# Patient Record
Sex: FEMALE | Race: WHITE | NOT HISPANIC OR LATINO | Employment: FULL TIME | URBAN - METROPOLITAN AREA
[De-identification: names, ages, dates, MRNs, and addresses within clinical notes are randomized per-mention and may not be internally consistent; named-entity substitution may affect disease eponyms.]

---

## 2022-10-28 ENCOUNTER — HOSPITAL ENCOUNTER (INPATIENT)
Facility: CLINIC | Age: 27
LOS: 1 days | Discharge: HOME | End: 2022-10-30
Attending: EMERGENCY MEDICINE | Admitting: OBSTETRICS & GYNECOLOGY
Payer: COMMERCIAL

## 2022-10-28 PROCEDURE — 99285 EMERGENCY DEPT VISIT HI MDM: CPT | Mod: 25

## 2022-10-28 PROCEDURE — C9803 HOPD COVID-19 SPEC COLLECT: HCPCS

## 2022-10-28 RX ORDER — LIDOCAINE 40 MG/G
CREAM TOPICAL
Status: DISCONTINUED | OUTPATIENT
Start: 2022-10-28 | End: 2022-10-30

## 2022-10-29 LAB
ABO/RH(D): NORMAL
ALBUMIN SERPL BCG-MCNC: 3.1 G/DL (ref 3.5–5.2)
ALBUMIN UR-MCNC: NEGATIVE MG/DL
ALP SERPL-CCNC: 126 U/L (ref 35–104)
ALT SERPL W P-5'-P-CCNC: 79 U/L (ref 10–35)
ALT SERPL W P-5'-P-CCNC: 89 U/L (ref 10–35)
ANION GAP SERPL CALCULATED.3IONS-SCNC: 11 MMOL/L (ref 7–15)
ANTIBODY SCREEN: NEGATIVE
APPEARANCE UR: CLEAR
AST SERPL W P-5'-P-CCNC: 63 U/L (ref 10–35)
AST SERPL W P-5'-P-CCNC: 79 U/L (ref 10–35)
BASOPHILS # BLD AUTO: 0 10E3/UL (ref 0–0.2)
BASOPHILS NFR BLD AUTO: 0 %
BILIRUB SERPL-MCNC: 0.2 MG/DL
BILIRUB UR QL STRIP: NEGATIVE
BUN SERPL-MCNC: 12.1 MG/DL (ref 6–20)
CALCIUM SERPL-MCNC: 9 MG/DL (ref 8.6–10)
CHLORIDE SERPL-SCNC: 108 MMOL/L (ref 98–107)
COLOR UR AUTO: ABNORMAL
CREAT SERPL-MCNC: 0.63 MG/DL (ref 0.51–0.95)
CREAT SERPL-MCNC: 0.67 MG/DL (ref 0.51–0.95)
DEPRECATED HCO3 PLAS-SCNC: 23 MMOL/L (ref 22–29)
EOSINOPHIL # BLD AUTO: 0.2 10E3/UL (ref 0–0.7)
EOSINOPHIL NFR BLD AUTO: 1 %
ERYTHROCYTE [DISTWIDTH] IN BLOOD BY AUTOMATED COUNT: 13.9 % (ref 10–15)
ERYTHROCYTE [DISTWIDTH] IN BLOOD BY AUTOMATED COUNT: 14 % (ref 10–15)
GFR SERPL CREATININE-BSD FRML MDRD: >90 ML/MIN/1.73M2
GFR SERPL CREATININE-BSD FRML MDRD: >90 ML/MIN/1.73M2
GLUCOSE SERPL-MCNC: 73 MG/DL (ref 70–99)
GLUCOSE UR STRIP-MCNC: NEGATIVE MG/DL
HCT VFR BLD AUTO: 30.6 % (ref 35–47)
HCT VFR BLD AUTO: 32.4 % (ref 35–47)
HGB BLD-MCNC: 9.2 G/DL (ref 11.7–15.7)
HGB BLD-MCNC: 9.9 G/DL (ref 11.7–15.7)
HGB UR QL STRIP: ABNORMAL
IMM GRANULOCYTES # BLD: 0.1 10E3/UL
IMM GRANULOCYTES NFR BLD: 1 %
KETONES UR STRIP-MCNC: NEGATIVE MG/DL
LEUKOCYTE ESTERASE UR QL STRIP: ABNORMAL
LYMPHOCYTES # BLD AUTO: 2.3 10E3/UL (ref 0.8–5.3)
LYMPHOCYTES NFR BLD AUTO: 19 %
MAGNESIUM SERPL-MCNC: 1.8 MG/DL (ref 1.7–2.3)
MCH RBC QN AUTO: 28.2 PG (ref 26.5–33)
MCH RBC QN AUTO: 28.2 PG (ref 26.5–33)
MCHC RBC AUTO-ENTMCNC: 30.1 G/DL (ref 31.5–36.5)
MCHC RBC AUTO-ENTMCNC: 30.6 G/DL (ref 31.5–36.5)
MCV RBC AUTO: 92 FL (ref 78–100)
MCV RBC AUTO: 94 FL (ref 78–100)
MONOCYTES # BLD AUTO: 0.8 10E3/UL (ref 0–1.3)
MONOCYTES NFR BLD AUTO: 7 %
NEUTROPHILS # BLD AUTO: 8.8 10E3/UL (ref 1.6–8.3)
NEUTROPHILS NFR BLD AUTO: 72 %
NITRATE UR QL: NEGATIVE
NRBC # BLD AUTO: 0 10E3/UL
NRBC BLD AUTO-RTO: 0 /100
PH UR STRIP: 5.5 [PH] (ref 5–7)
PLATELET # BLD AUTO: 304 10E3/UL (ref 150–450)
PLATELET # BLD AUTO: 327 10E3/UL (ref 150–450)
POTASSIUM SERPL-SCNC: 4 MMOL/L (ref 3.4–5.3)
PROT SERPL-MCNC: 6.8 G/DL (ref 6.4–8.3)
RBC # BLD AUTO: 3.26 10E6/UL (ref 3.8–5.2)
RBC # BLD AUTO: 3.51 10E6/UL (ref 3.8–5.2)
RBC URINE: 3 /HPF
SARS-COV-2 RNA RESP QL NAA+PROBE: NEGATIVE
SODIUM SERPL-SCNC: 142 MMOL/L (ref 136–145)
SP GR UR STRIP: 1.01 (ref 1–1.03)
SPECIMEN EXPIRATION DATE: NORMAL
SQUAMOUS EPITHELIAL: 1 /HPF
UROBILINOGEN UR STRIP-MCNC: NORMAL MG/DL
WBC # BLD AUTO: 10.5 10E3/UL (ref 4–11)
WBC # BLD AUTO: 12.1 10E3/UL (ref 4–11)
WBC URINE: 8 /HPF

## 2022-10-29 PROCEDURE — 258N000003 HC RX IP 258 OP 636: Performed by: EMERGENCY MEDICINE

## 2022-10-29 PROCEDURE — 258N000003 HC RX IP 258 OP 636: Performed by: OBSTETRICS & GYNECOLOGY

## 2022-10-29 PROCEDURE — 120N000001 HC R&B MED SURG/OB

## 2022-10-29 PROCEDURE — U0003 INFECTIOUS AGENT DETECTION BY NUCLEIC ACID (DNA OR RNA); SEVERE ACUTE RESPIRATORY SYNDROME CORONAVIRUS 2 (SARS-COV-2) (CORONAVIRUS DISEASE [COVID-19]), AMPLIFIED PROBE TECHNIQUE, MAKING USE OF HIGH THROUGHPUT TECHNOLOGIES AS DESCRIBED BY CMS-2020-01-R: HCPCS | Performed by: EMERGENCY MEDICINE

## 2022-10-29 PROCEDURE — 250N000011 HC RX IP 250 OP 636: Performed by: EMERGENCY MEDICINE

## 2022-10-29 PROCEDURE — 86850 RBC ANTIBODY SCREEN: CPT | Performed by: OBSTETRICS & GYNECOLOGY

## 2022-10-29 PROCEDURE — 85025 COMPLETE CBC W/AUTO DIFF WBC: CPT | Performed by: EMERGENCY MEDICINE

## 2022-10-29 PROCEDURE — 83735 ASSAY OF MAGNESIUM: CPT | Performed by: EMERGENCY MEDICINE

## 2022-10-29 PROCEDURE — 96374 THER/PROPH/DIAG INJ IV PUSH: CPT

## 2022-10-29 PROCEDURE — 81001 URINALYSIS AUTO W/SCOPE: CPT | Performed by: EMERGENCY MEDICINE

## 2022-10-29 PROCEDURE — 84450 TRANSFERASE (AST) (SGOT): CPT | Performed by: OBSTETRICS & GYNECOLOGY

## 2022-10-29 PROCEDURE — 82565 ASSAY OF CREATININE: CPT | Performed by: OBSTETRICS & GYNECOLOGY

## 2022-10-29 PROCEDURE — 85027 COMPLETE CBC AUTOMATED: CPT | Performed by: OBSTETRICS & GYNECOLOGY

## 2022-10-29 PROCEDURE — 36415 COLL VENOUS BLD VENIPUNCTURE: CPT | Performed by: EMERGENCY MEDICINE

## 2022-10-29 PROCEDURE — 80053 COMPREHEN METABOLIC PANEL: CPT | Performed by: EMERGENCY MEDICINE

## 2022-10-29 PROCEDURE — 36415 COLL VENOUS BLD VENIPUNCTURE: CPT | Performed by: OBSTETRICS & GYNECOLOGY

## 2022-10-29 PROCEDURE — 250N000013 HC RX MED GY IP 250 OP 250 PS 637: Performed by: OBSTETRICS & GYNECOLOGY

## 2022-10-29 PROCEDURE — 84460 ALANINE AMINO (ALT) (SGPT): CPT | Performed by: OBSTETRICS & GYNECOLOGY

## 2022-10-29 PROCEDURE — 86901 BLOOD TYPING SEROLOGIC RH(D): CPT | Performed by: OBSTETRICS & GYNECOLOGY

## 2022-10-29 PROCEDURE — 82040 ASSAY OF SERUM ALBUMIN: CPT | Performed by: EMERGENCY MEDICINE

## 2022-10-29 RX ORDER — HYDRALAZINE HYDROCHLORIDE 20 MG/ML
10 INJECTION INTRAMUSCULAR; INTRAVENOUS
Status: DISCONTINUED | OUTPATIENT
Start: 2022-10-29 | End: 2022-10-30 | Stop reason: HOSPADM

## 2022-10-29 RX ORDER — MAGNESIUM SULFATE HEPTAHYDRATE 40 MG/ML
4 INJECTION, SOLUTION INTRAVENOUS
Status: DISCONTINUED | OUTPATIENT
Start: 2022-10-29 | End: 2022-10-30 | Stop reason: HOSPADM

## 2022-10-29 RX ORDER — NALOXONE HYDROCHLORIDE 0.4 MG/ML
0.4 INJECTION, SOLUTION INTRAMUSCULAR; INTRAVENOUS; SUBCUTANEOUS
Status: DISCONTINUED | OUTPATIENT
Start: 2022-10-29 | End: 2022-10-30 | Stop reason: HOSPADM

## 2022-10-29 RX ORDER — IBUPROFEN 200 MG
200 TABLET ORAL EVERY 6 HOURS PRN
Status: DISCONTINUED | OUTPATIENT
Start: 2022-10-29 | End: 2022-10-29

## 2022-10-29 RX ORDER — MAGNESIUM SULFATE IN WATER 40 MG/ML
2 INJECTION, SOLUTION INTRAVENOUS CONTINUOUS
Status: DISCONTINUED | OUTPATIENT
Start: 2022-10-29 | End: 2022-10-30 | Stop reason: HOSPADM

## 2022-10-29 RX ORDER — LORAZEPAM 2 MG/ML
2 INJECTION INTRAMUSCULAR
Status: DISCONTINUED | OUTPATIENT
Start: 2022-10-29 | End: 2022-10-30 | Stop reason: HOSPADM

## 2022-10-29 RX ORDER — LABETALOL 100 MG/1
100 TABLET, FILM COATED ORAL EVERY 12 HOURS SCHEDULED
Status: DISCONTINUED | OUTPATIENT
Start: 2022-10-29 | End: 2022-10-30 | Stop reason: HOSPADM

## 2022-10-29 RX ORDER — SODIUM CHLORIDE, SODIUM LACTATE, POTASSIUM CHLORIDE, CALCIUM CHLORIDE 600; 310; 30; 20 MG/100ML; MG/100ML; MG/100ML; MG/100ML
INJECTION, SOLUTION INTRAVENOUS CONTINUOUS
Status: DISCONTINUED | OUTPATIENT
Start: 2022-10-29 | End: 2022-10-30 | Stop reason: HOSPADM

## 2022-10-29 RX ORDER — MAGNESIUM SULFATE HEPTAHYDRATE 40 MG/ML
2 INJECTION, SOLUTION INTRAVENOUS
Status: DISCONTINUED | OUTPATIENT
Start: 2022-10-29 | End: 2022-10-30 | Stop reason: HOSPADM

## 2022-10-29 RX ORDER — NALOXONE HYDROCHLORIDE 0.4 MG/ML
0.2 INJECTION, SOLUTION INTRAMUSCULAR; INTRAVENOUS; SUBCUTANEOUS
Status: DISCONTINUED | OUTPATIENT
Start: 2022-10-29 | End: 2022-10-30 | Stop reason: HOSPADM

## 2022-10-29 RX ORDER — LABETALOL HYDROCHLORIDE 5 MG/ML
80 INJECTION, SOLUTION INTRAVENOUS
Status: DISCONTINUED | OUTPATIENT
Start: 2022-10-29 | End: 2022-10-30 | Stop reason: HOSPADM

## 2022-10-29 RX ORDER — IBUPROFEN 600 MG/1
600 TABLET, FILM COATED ORAL EVERY 6 HOURS PRN
Status: DISCONTINUED | OUTPATIENT
Start: 2022-10-29 | End: 2022-10-30 | Stop reason: HOSPADM

## 2022-10-29 RX ORDER — SODIUM CHLORIDE, SODIUM LACTATE, POTASSIUM CHLORIDE, CALCIUM CHLORIDE 600; 310; 30; 20 MG/100ML; MG/100ML; MG/100ML; MG/100ML
10-125 INJECTION, SOLUTION INTRAVENOUS CONTINUOUS
Status: DISCONTINUED | OUTPATIENT
Start: 2022-10-29 | End: 2022-10-30 | Stop reason: HOSPADM

## 2022-10-29 RX ORDER — CALCIUM GLUCONATE 94 MG/ML
1 INJECTION, SOLUTION INTRAVENOUS
Status: DISCONTINUED | OUTPATIENT
Start: 2022-10-29 | End: 2022-10-30 | Stop reason: HOSPADM

## 2022-10-29 RX ORDER — LABETALOL HYDROCHLORIDE 5 MG/ML
20-80 INJECTION, SOLUTION INTRAVENOUS EVERY 10 MIN PRN
Status: DISCONTINUED | OUTPATIENT
Start: 2022-10-29 | End: 2022-10-30 | Stop reason: HOSPADM

## 2022-10-29 RX ORDER — ACETAMINOPHEN 325 MG/1
325 TABLET ORAL EVERY 4 HOURS PRN
Status: DISCONTINUED | OUTPATIENT
Start: 2022-10-29 | End: 2022-10-29

## 2022-10-29 RX ORDER — MAGNESIUM SULFATE HEPTAHYDRATE 500 MG/ML
10 INJECTION, SOLUTION INTRAMUSCULAR; INTRAVENOUS
Status: DISCONTINUED | OUTPATIENT
Start: 2022-10-29 | End: 2022-10-30 | Stop reason: HOSPADM

## 2022-10-29 RX ORDER — MAGNESIUM SULFATE HEPTAHYDRATE 40 MG/ML
4 INJECTION, SOLUTION INTRAVENOUS ONCE
Status: DISCONTINUED | OUTPATIENT
Start: 2022-10-29 | End: 2022-10-30 | Stop reason: HOSPADM

## 2022-10-29 RX ORDER — OXYCODONE HYDROCHLORIDE 5 MG/1
5 TABLET ORAL EVERY 4 HOURS PRN
Status: DISCONTINUED | OUTPATIENT
Start: 2022-10-29 | End: 2022-10-30 | Stop reason: HOSPADM

## 2022-10-29 RX ORDER — LABETALOL HYDROCHLORIDE 5 MG/ML
20 INJECTION, SOLUTION INTRAVENOUS
Status: COMPLETED | OUTPATIENT
Start: 2022-10-29 | End: 2022-10-29

## 2022-10-29 RX ORDER — ACETAMINOPHEN 325 MG/1
975 TABLET ORAL EVERY 6 HOURS PRN
Status: DISCONTINUED | OUTPATIENT
Start: 2022-10-29 | End: 2022-10-30 | Stop reason: HOSPADM

## 2022-10-29 RX ORDER — LABETALOL HYDROCHLORIDE 5 MG/ML
40 INJECTION, SOLUTION INTRAVENOUS
Status: DISCONTINUED | OUTPATIENT
Start: 2022-10-29 | End: 2022-10-30 | Stop reason: HOSPADM

## 2022-10-29 RX ORDER — MAGNESIUM SULFATE HEPTAHYDRATE 40 MG/ML
4 INJECTION, SOLUTION INTRAVENOUS ONCE
Status: COMPLETED | OUTPATIENT
Start: 2022-10-29 | End: 2022-10-29

## 2022-10-29 RX ADMIN — IBUPROFEN 600 MG: 600 TABLET ORAL at 09:57

## 2022-10-29 RX ADMIN — SODIUM CHLORIDE, POTASSIUM CHLORIDE, SODIUM LACTATE AND CALCIUM CHLORIDE 75 ML/HR: 600; 310; 30; 20 INJECTION, SOLUTION INTRAVENOUS at 23:44

## 2022-10-29 RX ADMIN — IBUPROFEN 600 MG: 600 TABLET ORAL at 22:05

## 2022-10-29 RX ADMIN — SODIUM CHLORIDE, POTASSIUM CHLORIDE, SODIUM LACTATE AND CALCIUM CHLORIDE: 600; 310; 30; 20 INJECTION, SOLUTION INTRAVENOUS at 00:48

## 2022-10-29 RX ADMIN — MAGNESIUM SULFATE HEPTAHYDRATE 4 G: 40 INJECTION, SOLUTION INTRAVENOUS at 02:10

## 2022-10-29 RX ADMIN — ACETAMINOPHEN 975 MG: 325 TABLET, FILM COATED ORAL at 22:05

## 2022-10-29 RX ADMIN — ACETAMINOPHEN 975 MG: 325 TABLET, FILM COATED ORAL at 16:05

## 2022-10-29 RX ADMIN — MAGNESIUM SULFATE HEPTAHYDRATE 2 G/HR: 40 INJECTION, SOLUTION INTRAVENOUS at 23:44

## 2022-10-29 RX ADMIN — ACETAMINOPHEN 975 MG: 325 TABLET, FILM COATED ORAL at 02:57

## 2022-10-29 RX ADMIN — MAGNESIUM SULFATE HEPTAHYDRATE 2 G/HR: 40 INJECTION, SOLUTION INTRAVENOUS at 03:01

## 2022-10-29 RX ADMIN — IBUPROFEN 600 MG: 600 TABLET ORAL at 02:57

## 2022-10-29 RX ADMIN — LABETALOL HYDROCHLORIDE 100 MG: 100 TABLET, FILM COATED ORAL at 08:12

## 2022-10-29 RX ADMIN — IBUPROFEN 600 MG: 600 TABLET ORAL at 16:04

## 2022-10-29 RX ADMIN — LABETALOL HYDROCHLORIDE 100 MG: 100 TABLET, FILM COATED ORAL at 20:01

## 2022-10-29 RX ADMIN — LABETALOL HYDROCHLORIDE 20 MG: 5 INJECTION, SOLUTION INTRAVENOUS at 00:44

## 2022-10-29 RX ADMIN — SODIUM CHLORIDE, POTASSIUM CHLORIDE, SODIUM LACTATE AND CALCIUM CHLORIDE 75 ML/HR: 600; 310; 30; 20 INJECTION, SOLUTION INTRAVENOUS at 11:47

## 2022-10-29 RX ADMIN — ACETAMINOPHEN 975 MG: 325 TABLET, FILM COATED ORAL at 09:58

## 2022-10-29 ASSESSMENT — ACTIVITIES OF DAILY LIVING (ADL)
FALL_HISTORY_WITHIN_LAST_SIX_MONTHS: NO
ADLS_ACUITY_SCORE: 20
CONCENTRATING,_REMEMBERING_OR_MAKING_DECISIONS_DIFFICULTY: NO
CHANGE_IN_FUNCTIONAL_STATUS_SINCE_ONSET_OF_CURRENT_ILLNESS/INJURY: NO
ADLS_ACUITY_SCORE: 20
WALKING_OR_CLIMBING_STAIRS_DIFFICULTY: NO
DOING_ERRANDS_INDEPENDENTLY_DIFFICULTY: NO
ADLS_ACUITY_SCORE: 20
ADLS_ACUITY_SCORE: 35
ADLS_ACUITY_SCORE: 20
DIFFICULTY_EATING/SWALLOWING: NO
TOILETING_ISSUES: NO
ADLS_ACUITY_SCORE: 20
VISION_MANAGEMENT: GLASSES
ADLS_ACUITY_SCORE: 35
WEAR_GLASSES_OR_BLIND: YES
DRESSING/BATHING_DIFFICULTY: NO

## 2022-10-29 NOTE — PROVIDER NOTIFICATION
10/29/22 0613   Comments   Comments IV labetalol not administered due to repeat BP not severe

## 2022-10-29 NOTE — PROVIDER NOTIFICATION
10/29/22 0610   Provider Notification   Provider Name/Title dr Crabtree   Method of Notification Phone   Request Evaluate - Remote   Notification Reason Status Update   Updated MD of 2 severe range BP while pt was sleeping. Informed of 02 started via NC for sats 93% on RA. Orders to restart IV labetalol at 20mg. Then start PO labetalol at 0800.

## 2022-10-29 NOTE — PLAN OF CARE
"Pt here as readmit for elevated BP secondary to ghtn s/p c/s delivery on 10/24/22. Pt reports she was being induced for GHTN. But upon arrival her BP were \"normal\". Pt was discharged on 10/26 and started monitoring her BP last night when she noted it was 180's/100's. She denies headache, vision changes, URQ pain, n/v but reports she has had some new onset heartburn but denies it at this time.  reflex's on admission were +3, with 1 beat of clonus bilaterally and 3+ edema in LE. She is here with her partner and son.  Plan to start magnesium overnight. Will start labetalol BID in the morning if BP are sustained > 140/90.   "

## 2022-10-29 NOTE — ED NOTES
[facility]  ED Nurse Handoff Report    Leigh Ann Hagan is a 27 year old female   ED Chief complaint: Hypertension  . ED Diagnosis:   Final diagnoses:   None     Allergies: No Known Allergies    Code Status: Full Code  Activity level - Baseline/Home:  Independent. Activity Level - Current:   Independent. Lift room needed: No. Bariatric: No   Needed: No   Isolation: No. Infection: Not Applicable.     Vital Signs:   Vitals:    10/29/22 0030 10/29/22 0040 10/29/22 0050 10/29/22 0100   BP: (!) 156/115 (!) 162/112 (!) 155/100 (!) 143/109   Pulse: 90  92 88   Resp:       Temp:       TempSrc:       SpO2:       Weight:           Cardiac Rhythm:  ,      Pain level:    Patient confused: No. Patient Falls Risk: No.   Elimination Status: Has voided   Patient Report - Initial Complaint: 4 days post c -section with a day of HTN. Denies other s/s  . Focused Assessment: A&OX4. VERY anxious. BP's coming down.    Tests Performed:   Labs Ordered and Resulted from Time of ED Arrival to Time of ED Departure   COMPREHENSIVE METABOLIC PANEL - Abnormal       Result Value    Sodium 142      Potassium 4.0      Chloride 108 (*)     Carbon Dioxide (CO2) 23      Anion Gap 11      Urea Nitrogen 12.1      Creatinine 0.67      Calcium 9.0      Glucose 73      Alkaline Phosphatase 126 (*)     AST 79 (*)     ALT 89 (*)     Protein Total 6.8      Albumin 3.1 (*)     Bilirubin Total 0.2      GFR Estimate >90     ROUTINE UA WITH MICROSCOPIC - Abnormal    Color Urine Straw      Appearance Urine Clear      Glucose Urine Negative      Bilirubin Urine Negative      Ketones Urine Negative      Specific Gravity Urine 1.009      Blood Urine Small (*)     pH Urine 5.5      Protein Albumin Urine Negative      Urobilinogen Urine Normal      Nitrite Urine Negative      Leukocyte Esterase Urine Trace (*)     RBC Urine 3 (*)     WBC Urine 8 (*)     Squamous Epithelials Urine 1     CBC WITH PLATELETS AND DIFFERENTIAL - Abnormal    WBC Count 12.1  (*)     RBC Count 3.51 (*)     Hemoglobin 9.9 (*)     Hematocrit 32.4 (*)     MCV 92      MCH 28.2      MCHC 30.6 (*)     RDW 13.9      Platelet Count 327      % Neutrophils 72      % Lymphocytes 19      % Monocytes 7      % Eosinophils 1      % Basophils 0      % Immature Granulocytes 1      NRBCs per 100 WBC 0      Absolute Neutrophils 8.8 (*)     Absolute Lymphocytes 2.3      Absolute Monocytes 0.8      Absolute Eosinophils 0.2      Absolute Basophils 0.0      Absolute Immature Granulocytes 0.1      Absolute NRBCs 0.0     MAGNESIUM - Normal    Magnesium 1.8     COVID-19 VIRUS (CORONAVIRUS) BY PCR     No orders to display     . Abnormal Results: see results.   Treatments provided: see MAR  Family Comments: NA  OBS brochure/video discussed/provided to patient:  No  ED Medications:   Medications   lidocaine 1 % 0.1-1 mL (has no administration in time range)   lidocaine (LMX4) cream (has no administration in time range)   sodium chloride (PF) 0.9% PF flush 3 mL (has no administration in time range)   sodium chloride (PF) 0.9% PF flush 3 mL (has no administration in time range)   lactated ringers infusion ( Intravenous New Bag 10/29/22 0048)   labetalol (NORMODYNE/TRANDATE) injection 20 mg (20 mg Intravenous Given 10/29/22 0044)     And   labetalol (NORMODYNE/TRANDATE) injection 40 mg (has no administration in time range)     And   labetalol (NORMODYNE/TRANDATE) injection 80 mg (has no administration in time range)     Drips infusing:  No  For the majority of the shift, the patient's behavior Green. Interventions performed were NA.    Sepsis treatment initiated: No     Patient tested for COVID 19 prior to admission: YES    ED Nurse Name/Phone Number: Benjamin Presley RN,   1:04 AM

## 2022-10-29 NOTE — H&P
"Admitted: 10/28/2022    DATE OF SERVICE:  10/29/2022    CHIEF COMPLAINT:  Postpartum preeclampsia.    INDICATIONS FOR PROCEDURE:  The patient is a 27-year-old G1, P1 at 5 days postpartum.  She was followed with a [facility] in [place], [place] and recently moved to [place].  This is her first pregnancy and it was fairly uncomplicated until at 36 weeks she started having elevated blood pressures.  She was diagnosed with gestational hypertension and was induced at 37 weeks.  She said it was a \"5-day induction\" and that she received multiple medications; however, near the end of the induction she said her body would not deliver the baby and she required a  section.  She notes the  section was unremarkable and she was sent home with routine teaching and no medication.  She has been following her blood pressures at home.  On the evening of 10/28/2022, around dinnertime, she checked her blood pressure and it was 180s/100s.  At that point she called her obstetrician in [place] and they requested that she report to a nearby Emergency Department.      When discussed, she has noticed an increase in heartburn over the last 24 hours as well as increase in swelling; however, she has no other acute symptoms that she endorses.  She denies any visual changes, headaches, right upper quadrant pain.    OBSTETRICAL HISTORY:  Pertinent for this pregnancy, newly postpartum.    GYN HISTORY:  Essentially negative.    MEDICAL HISTORY:  Significant for depression and anxiety, history of suicide attempt in 2018, obesity, and she reports mild autism which she has been tested for.    SOCIAL HISTORY:  She works in a [employer] facility and as a .  She has no significant drug, alcohol or tobacco use history.    FAMILY HISTORY:  She has a paternal grandfather with colon cancer and her father has thyroid disease, otherwise her family history is negative.    REVIEW OF SYSTEMS:  Positive for lower extremity swelling and " some mild epigastric pain as well as incisional discomfort.    OBJECTIVE:  VITAL SIGNS:  Blood pressure ranging from 165-134 over 115-88 since admission.  CARDIAC:  Regular rate and rhythm.  CHEST:  Clear to auscultation bilaterally.  ABDOMEN:  Obese, nontender.  Incision is healing well with what appears to be Dermabond in place.  EXTREMITIES:  Show +2 to +3 pitting edema and reflexes show +2 to +3 reflexes in the lower extremities and +2 reflexes in the upper extremities, 1 beat of clonus bilaterally in the lower extremities.     LABORATORY DATA:  ALT is 89.  AST is 79.  Hemoglobin is 99, platelets are 327.  Creatinine is 0.67.    ASSESSMENT AND PLAN:  A 27-year-old P1-0-0-1, postoperative day #6 from a primary low transverse  section for failed induction at 37 weeks for gestational hypertension, now with severe preeclampsia postpartum.  1.  We will admit her and start magnesium sulfate IV for seizure prophylaxis if needed.  We will then start antihypertensives orally and she will be placed on the hypertensive protocol for IV management if she becomes in the severe range.  We discussed with her the underlying physiology of postpartum preeclampsia as well as treatment options and goals of treatment.  2.  We will keep her on her home medications for pain including ibuprofen, Tylenol and oxycodone, and she will continue to breastfeed as normal.  3.  We will recheck her labs in the morning to determine trend of increase or decrease, and we will watch her intake and output strictly.      Karley Crabtree MD        D: 10/29/2022   T: 10/29/2022   MT: [id number]    Name:     MICHAEL MOY  MRN:      [id number]        Account:     [id number]   :      1995           Admitted:    10/28/2022       Document: [id number]

## 2022-10-29 NOTE — ED PROVIDER NOTES
History   Chief Complaint:  Hypertension       HPI   Leigh Ann Hagan is a 27 year old female with history of recent induction for pregnancy-induced hypertension and delivery on 10/24/2022 who presents with recurrent high blood pressures at home today.  When she woke up from a nap it was 180s/114.  There is no associated headache, visual changes, nausea, vomiting, upper abdominal pain, chest pain, or shortness of breath.  She had the leg swelling in the hospital after delivery that initially showed some improvement but then is worsened again.  She has no urinary symptoms.  Reports that her vaginal bleeding is manageable.  Is currently breast-feeding.  All of her prenatal care was done at the [facility] which is where she also delivered.  They moved during her pregnancy and this is now the closest hospital for them.    Review of Systems  Ten system ROS reviewed and is negative except as above     Allergies:    No Known Allergies    Medications:  Ibuprofen & Tylenol prn    Past Medical History:     Recent delivery    Past Surgical History:    Past Surgical History:   Procedure Laterality Date      SECTION  10/24/2022        Family History:    No family history on file.    Social History:  Here with partner and infant  Lives in [place]    Physical Exam     Patient Vitals for the past 24 hrs:   BP Temp Temp src Pulse Resp SpO2 Weight   10/29/22 0225 (!) 142/89 -- -- -- -- -- --   10/29/22 0223 (!) 141/93 -- -- -- -- -- --   10/29/22 0219 (!) 150/88 -- -- -- -- -- --   10/29/22 0217 (!) 153/90 -- -- -- -- -- --   10/29/22 0215 (!) 142/92 -- -- -- -- -- --   10/29/22 0140 (!) 144/105 -- -- 83 -- -- --   10/29/22 0130 (!) 134/96 -- -- 86 -- -- --   10/29/22 0120 (!) 141/101 -- -- 86 -- -- --   10/29/22 0107 (!) 145/107 -- -- -- -- -- --   10/29/22 0100 (!) 143/109 -- -- 88 -- -- --   10/29/22 0050 (!) 155/100 -- -- 92 -- -- --   10/29/22 0040 (!) 162/112 -- -- -- -- -- --   10/29/22 0030 (!) 156/115 -- -- 90 -- -- --    10/29/22 0015 (!) 148/112 -- -- -- -- -- --   10/29/22 0000 (!) 165/104 -- -- 93 -- -- --   10/28/22 2141 (!) 152/106 98.3  F (36.8  C) Temporal 96 18 96 % 120 kg (264 lb 8.8 oz)       Physical Exam  Eyes:  Sclera white; Pupils are equal and round  ENT:    External ears and nares normal  CV:  Rate as above with regular rhythm   Resp:  Breath sounds clear and equal bilaterally    Non-labored, no retractions or accessory muscle use  GI:  Abdomen is soft, non-tender, non-distended    No rebound tenderness or peritoneal features  MS:  Moves all extremities, BLE edema  Skin:  Warm and dry, resolving bruising around  incision which is C/D/I  Neuro:  Speech is normal and fluent. No apparent deficit.        Emergency Department Course   ECG  No results found for this or any previous visit.    Imaging:  No orders to display     Laboratory:  Labs Ordered and Resulted from Time of ED Arrival to Time of ED Departure   COMPREHENSIVE METABOLIC PANEL - Abnormal       Result Value    Sodium 142      Potassium 4.0      Chloride 108 (*)     Carbon Dioxide (CO2) 23      Anion Gap 11      Urea Nitrogen 12.1      Creatinine 0.67      Calcium 9.0      Glucose 73      Alkaline Phosphatase 126 (*)     AST 79 (*)     ALT 89 (*)     Protein Total 6.8      Albumin 3.1 (*)     Bilirubin Total 0.2      GFR Estimate >90     ROUTINE UA WITH MICROSCOPIC - Abnormal    Color Urine Straw      Appearance Urine Clear      Glucose Urine Negative      Bilirubin Urine Negative      Ketones Urine Negative      Specific Gravity Urine 1.009      Blood Urine Small (*)     pH Urine 5.5      Protein Albumin Urine Negative      Urobilinogen Urine Normal      Nitrite Urine Negative      Leukocyte Esterase Urine Trace (*)     RBC Urine 3 (*)     WBC Urine 8 (*)     Squamous Epithelials Urine 1     CBC WITH PLATELETS AND DIFFERENTIAL - Abnormal    WBC Count 12.1 (*)     RBC Count 3.51 (*)     Hemoglobin 9.9 (*)     Hematocrit 32.4 (*)     MCV 92      MCH  28.2      MCHC 30.6 (*)     RDW 13.9      Platelet Count 327      % Neutrophils 72      % Lymphocytes 19      % Monocytes 7      % Eosinophils 1      % Basophils 0      % Immature Granulocytes 1      NRBCs per 100 WBC 0      Absolute Neutrophils 8.8 (*)     Absolute Lymphocytes 2.3      Absolute Monocytes 0.8      Absolute Eosinophils 0.2      Absolute Basophils 0.0      Absolute Immature Granulocytes 0.1      Absolute NRBCs 0.0     MAGNESIUM - Normal    Magnesium 1.8          Procedures    Interventions:  Medications   lidocaine 1 % 0.1-1 mL (has no administration in time range)   lidocaine (LMX4) cream (has no administration in time range)   sodium chloride (PF) 0.9% PF flush 3 mL (has no administration in time range)   sodium chloride (PF) 0.9% PF flush 3 mL (has no administration in time range)   lactated ringers infusion ( Intravenous New Bag 10/29/22 0048)   labetalol (NORMODYNE/TRANDATE) injection 20 mg (20 mg Intravenous Given 10/29/22 0044)     And   labetalol (NORMODYNE/TRANDATE) injection 40 mg (has no administration in time range)     And   labetalol (NORMODYNE/TRANDATE) injection 80 mg (has no administration in time range)   lactated ringers infusion (has no administration in time range)   magnesium sulfate 4 g in 100 mL sterile water (premade) (4 g Intravenous New Bag 10/29/22 0210)     Followed by   magnesium sulfate infusion (has no administration in time range)   calcium gluconate 10 % injection 1 g (has no administration in time range)   oxyCODONE (ROXICODONE) tablet 5 mg (has no administration in time range)   lactated ringers infusion (has no administration in time range)   magnesium sulfate 2 g in water intermittent infusion (has no administration in time range)   magnesium sulfate 4 g in 100 mL sterile water (premade) (has no administration in time range)   magnesium sulfate injection 10 g (has no administration in time range)   LORazepam (ATIVAN) injection 2 mg (has no administration in time  range)   magnesium sulfate 4 g in 100 mL sterile water (premade) (has no administration in time range)   magnesium sulfate infusion (has no administration in time range)   calcium gluconate 10 % injection 1 g (has no administration in time range)   naloxone (NARCAN) injection 0.2 mg (has no administration in time range)     Or   naloxone (NARCAN) injection 0.4 mg (has no administration in time range)     Or   naloxone (NARCAN) injection 0.2 mg (has no administration in time range)     Or   naloxone (NARCAN) injection 0.4 mg (has no administration in time range)   acetaminophen (TYLENOL) tablet 975 mg (975 mg Oral Given 10/29/22 0257)   ibuprofen (ADVIL/MOTRIN) tablet 600 mg (600 mg Oral Given 10/29/22 0257)   labetalol (NORMODYNE) tablet 100 mg (has no administration in time range)        Disposition:  The patient was admitted to the hospital under the care of Dr. Esquivel.     Impression & Plan     Medical Decision Making:  Requires two blood pressures above 160/110 for emergent blood pressure control for pre-eclampsia.  Once roomed, monitoring every 15 minutes.  Nursing notified me of persistent elevation and labetalol was used.  No protein in the urine.  LFTs returned elevated consistent with post-partum pre-eclampsia.  Magnesium ordered for seizure prophylaxis.  Admitted to Dr Esquivel, on call for unassigned patients as patients Obstetric team does not cover this hospital.  Expedited to L&D where magnesium infusion will be initiated.     Diagnosis:    ICD-10-CM    1. Preeclampsia in postpartum period  O14.95              Sandy Wisdom MD  10/29/22 3333

## 2022-10-30 ENCOUNTER — HOSPITAL ENCOUNTER (INPATIENT)
Facility: CLINIC | Age: 27
End: 2022-10-30
Payer: COMMERCIAL

## 2022-10-30 VITALS
HEART RATE: 76 BPM | SYSTOLIC BLOOD PRESSURE: 136 MMHG | OXYGEN SATURATION: 100 % | DIASTOLIC BLOOD PRESSURE: 82 MMHG | RESPIRATION RATE: 18 BRPM | TEMPERATURE: 97.6 F | WEIGHT: 264.55 LBS

## 2022-10-30 LAB
ALT SERPL W P-5'-P-CCNC: 78 U/L (ref 10–35)
AST SERPL W P-5'-P-CCNC: 52 U/L (ref 10–35)

## 2022-10-30 PROCEDURE — 250N000013 HC RX MED GY IP 250 OP 250 PS 637: Performed by: OBSTETRICS & GYNECOLOGY

## 2022-10-30 PROCEDURE — 36415 COLL VENOUS BLD VENIPUNCTURE: CPT | Performed by: OBSTETRICS & GYNECOLOGY

## 2022-10-30 PROCEDURE — 84460 ALANINE AMINO (ALT) (SGPT): CPT | Performed by: OBSTETRICS & GYNECOLOGY

## 2022-10-30 PROCEDURE — 84450 TRANSFERASE (AST) (SGOT): CPT | Performed by: OBSTETRICS & GYNECOLOGY

## 2022-10-30 RX ORDER — LABETALOL 100 MG/1
100 TABLET, FILM COATED ORAL EVERY 12 HOURS
Qty: 30 TABLET | Refills: 0 | Status: SHIPPED | OUTPATIENT
Start: 2022-10-30 | End: 2023-10-30

## 2022-10-30 RX ADMIN — IBUPROFEN 600 MG: 600 TABLET ORAL at 03:34

## 2022-10-30 RX ADMIN — LABETALOL HYDROCHLORIDE 100 MG: 100 TABLET, FILM COATED ORAL at 09:55

## 2022-10-30 RX ADMIN — ACETAMINOPHEN 975 MG: 325 TABLET, FILM COATED ORAL at 03:34

## 2022-10-30 ASSESSMENT — ACTIVITIES OF DAILY LIVING (ADL)
ADLS_ACUITY_SCORE: 20

## 2022-10-30 NOTE — DISCHARGE INSTRUCTIONS
Postop  Birth Instructions    Activity     Do not lift more than 10 pounds for 6 weeks after surgery.  Ask family and friends for help when you need it.  No driving until you have stopped taking your pain medications (usually two weeks after surgery).  No heavy exercise or activity for 6 weeks.  Don't do anything that will put a strain on your surgery site.  Don't strain when using the toilet.  Your care team may prescribe a stool softener if you have problems with your bowel movements.     To care for your incision:     Keep the incision clean and dry.  Do not soak your incision in water. No swimming or hot tubs until it has fully healed. You may soak in the bathtub if the water level is below your incision.  Do not use peroxide, gel, cream, lotion, or ointment on your incision.  Adjust your clothes to avoid pressure on your surgery site (check the elastic in your underwear for example).     You may see a small amount of clear or pink drainage and this is normal.  Check with your health care provider:     If the drainage increases or has an odor.  If the incision reddens, you have swelling, or develop a rash.  If you have increased pain and the medicine we prescribed doesn't help.  If you have a fever above 100.4 F (38 C) with or without chills when placing thermometer under your tongue.   The area around your incision (surgery wound), will feel numb.  This is normal. The numbness should go away in less than a year.     Keep your hands clean:  Always wash your hands before touching your incision (surgery wound). This helps reduce your risk of infection. If your hands aren't dirty, you may use an alcohol hand-rub to clean your hands. Keep your nails clean and short.    Call your healthcare provider if you have any of these symptoms:     You soak a sanitary pad with blood within 1 hour, or you see blood clots larger than a golf ball.  Bleeding that lasts more than 6 weeks.  Vaginal discharge that smells  bad.  Severe pain, cramping or tenderness in your lower belly area.  A need to urinate more frequently (use the toilet more often), more urgently (use the toilet very quickly), or it burns when you urinate.  Nausea and vomiting.  Redness, swelling or pain around a vein in your leg.  Problems breastfeeding or a red or painful area on your breast.  Chest pain and cough or are gasping for air.  Problems with coping with sadness, anxiety or depression. If you have concerns about hurting yourself or the baby, call your provider immediately.    You have questions or concerns after you return home.   Check Bp's at home twice daily  Continue Labetolol twice a day- next dose due this evening    Liver function tests improved- ALT 89 on admission, now 78 :  AST 79 on admission , now 52. Trending down  Call your regular OB physician for continued follow-up

## 2022-10-30 NOTE — PLAN OF CARE
Data: Patient assessed in the [facility] for elevated blood pressures, preeclampsia  .  Action:  Condition improved/stable.     Response: Orders to discharge home per Luke Carter.  Patient verbalized understanding of education and verbalized agreement with plan. Discharged to home at 1020.   Pt verbalized understanding to f/u with her regular OB this week. Will check BP's twice daily and continue on labetolol

## 2022-10-30 NOTE — PROGRESS NOTES
HD2    Patient feels much better than when she was admitted    BPs 130s/80s    Plan  1)Magnesium was discontinued at 0300  2)BPs stable on Labetalol 100mg po bid. Will discharge patient with rx  3)Check BPs at home twice daily. If SBP >150 or DBP >100, patient to repeat in 15 minutes. If elevation persists, patient to call her provider  4)Patient has f/u with her delivering provider on 11/7. I recommend follow up this week in addition.

## 2022-10-31 ENCOUNTER — PATIENT OUTREACH (OUTPATIENT)
Dept: CARE COORDINATION | Facility: CLINIC | Age: 27
End: 2022-10-31

## 2022-10-31 NOTE — PROGRESS NOTES
"Clinic Care Coordination Contact  [facility]: Post-Discharge Note  SITUATION                                                      Admission:    Admission Date: 10/28/22   Reason for Admission: Postpartum preeclampsia  Discharge:   Discharge Date: 10/30/22  Discharge Diagnosis: Postpartum preeclampsia    BACKGROUND                                                      INDICATIONS FOR PROCEDURE:  The patient is a 27-year-old G1, P1 at 5 days postpartum.  She was followed with a [facility] in [place], [place] and recently moved to [place].  This is her first pregnancy and it was fairly uncomplicated until at 36 weeks she started having elevated blood pressures.  She was diagnosed with gestational hypertension and was induced at 37 weeks.  She said it was a \"5-day induction\" and that she received multiple medications; however, near the end of the induction she said her body would not deliver the baby and she required a  section.  She notes the  section was unremarkable and she was sent home with routine teaching and no medication.  She has been following her blood pressures at home.  On the evening of 10/28/2022, around dinnertime, she checked her blood pressure and it was 180s/100s.  At that point she called her obstetrician in [place] and they requested that she report to a nearby Emergency Department.       When discussed, she has noticed an increase in heartburn over the last 24 hours as well as increase in swelling; however, she has no other acute symptoms that she endorses.  She denies any visual changes, headaches, right upper quadrant pain.     OBSTETRICAL HISTORY:  Pertinent for this pregnancy, newly postpartum.     GYN HISTORY:  Essentially negative.     MEDICAL HISTORY:  Significant for depression and anxiety, history of suicide attempt in 2018, obesity, and she reports mild autism which she has been tested for.     SOCIAL HISTORY:  She works in a [employer] [place] and as a " .  She has no significant drug, alcohol or tobacco use history.     FAMILY HISTORY:  She has a paternal grandfather with colon cancer and her father has thyroid disease, otherwise her family history is negative.     REVIEW OF SYSTEMS:  Positive for lower extremity swelling and some mild epigastric pain as well as incisional discomfort.    ASSESSMENT           Discharge Assessment  How are you doing now that you are home?: Patient shares that she is doing much better. Just took her second dose of blood pressure medication and no concerns with this. Has her post-OB appt on Nov 7th with dr walters of [place]. Has help at home with baby right now. No other concerns/needs at this time.  How are your symptoms? (Red Flag symptoms escalate to triage hotline per guidelines): Improved  Do you feel your condition is stable enough to be safe at home until your provider visit?: Yes  Does the patient have their discharge instructions? : Yes  Does the patient have questions regarding their discharge instructions? : No  Were you started on any new medications or were there changes to any of your previous medications? : Yes  Does the patient have all of their medications?: Yes  Do you have questions regarding any of your medications? : No  Do you have all of your needed medical supplies or equipment (DME)?  (i.e. oxygen tank, CPAP, cane, etc.): Yes  Discharge follow-up appointment scheduled within 14 calendar days? : Yes  Discharge Follow Up Appointment Date: 11/07/22  Discharge Follow Up Appointment Scheduled with?: Primary Care Provider    Post-op ([facility] Only)  If the patient had a surgery or procedure, do they have any questions for a nurse?: No    Post-op (Clinicians Only)  Did the patient have surgery or a procedure: No  Fever: No        PLAN                                                      Outpatient Plan:  1.  We will admit her and start magnesium sulfate IV for seizure prophylaxis if needed.  We will then start  antihypertensives orally and she will be placed on the hypertensive protocol for IV management if she becomes in the severe range.  We discussed with her the underlying physiology of postpartum preeclampsia as well as treatment options and goals of treatment.  2.  We will keep her on her home medications for pain including ibuprofen, Tylenol and oxycodone, and she will continue to breastfeed as normal.  3.  We will recheck her labs in the morning to determine trend of increase or decrease, and we will watch her intake and output strictly.       No future appointments.      For any urgent concerns, please contact our 24 hour nurse triage line: [phone] ([phone])         CADE Sears

## 2022-11-20 NOTE — DISCHARGE SUMMARY
26 yo P1 who was admitted on 10/28/2022 at 5 days post partum after a . She had required an induction for pre-eclampsia. Upon discharge, she was checking BPs at home and noted a BP of 180/100. She was admitted of magnesium IV thearpy and oral antihypertensive therapy. After 24 hours of magnesium therapy and starting Labetalol 100mg bid, she began to feel better and her BPs stabilized at 130/80s. She was stable for discharge to home with outpatient follow up within a week.

## 2023-10-30 ENCOUNTER — OFFICE VISIT (OUTPATIENT)
Dept: MIDWIFE SERVICES | Facility: CLINIC | Age: 28
End: 2023-10-30
Payer: COMMERCIAL

## 2023-10-30 VITALS
HEIGHT: 64 IN | DIASTOLIC BLOOD PRESSURE: 88 MMHG | WEIGHT: 286 LBS | BODY MASS INDEX: 48.83 KG/M2 | SYSTOLIC BLOOD PRESSURE: 118 MMHG

## 2023-10-30 DIAGNOSIS — Z11.3 SCREEN FOR STD (SEXUALLY TRANSMITTED DISEASE): ICD-10-CM

## 2023-10-30 DIAGNOSIS — Z86.59 HX OF MAJOR DEPRESSION: ICD-10-CM

## 2023-10-30 DIAGNOSIS — Z00.00 ROUTINE GENERAL MEDICAL EXAMINATION AT A HEALTH CARE FACILITY: Primary | ICD-10-CM

## 2023-10-30 DIAGNOSIS — Z12.4 SCREENING FOR CERVICAL CANCER: ICD-10-CM

## 2023-10-30 PROBLEM — Z87.891 PERSONAL HISTORY OF TOBACCO USE, PRESENTING HAZARDS TO HEALTH: Status: ACTIVE | Noted: 2022-05-13

## 2023-10-30 PROBLEM — F33.42 RECURRENT MAJOR DEPRESSION IN FULL REMISSION (H): Status: ACTIVE | Noted: 2023-10-30

## 2023-10-30 PROBLEM — E66.01 MORBID OBESITY (H): Status: ACTIVE | Noted: 2023-10-30

## 2023-10-30 PROBLEM — F41.1 GENERALIZED ANXIETY DISORDER: Status: ACTIVE | Noted: 2019-10-14

## 2023-10-30 LAB
ALBUMIN SERPL BCG-MCNC: 3.8 G/DL (ref 3.5–5.2)
ALP SERPL-CCNC: 110 U/L (ref 35–104)
ALT SERPL W P-5'-P-CCNC: 15 U/L (ref 0–50)
ANION GAP SERPL CALCULATED.3IONS-SCNC: 11 MMOL/L (ref 7–15)
AST SERPL W P-5'-P-CCNC: 16 U/L (ref 0–45)
BASOPHILS # BLD AUTO: 0 10E3/UL (ref 0–0.2)
BASOPHILS NFR BLD AUTO: 1 %
BILIRUB SERPL-MCNC: 0.2 MG/DL
BUN SERPL-MCNC: 11.9 MG/DL (ref 6–20)
CALCIUM SERPL-MCNC: 8.8 MG/DL (ref 8.6–10)
CHLORIDE SERPL-SCNC: 108 MMOL/L (ref 98–107)
CHOLEST SERPL-MCNC: 122 MG/DL
CREAT SERPL-MCNC: 0.68 MG/DL (ref 0.51–0.95)
DEPRECATED HCO3 PLAS-SCNC: 24 MMOL/L (ref 22–29)
EGFRCR SERPLBLD CKD-EPI 2021: >90 ML/MIN/1.73M2
EOSINOPHIL # BLD AUTO: 0.1 10E3/UL (ref 0–0.7)
EOSINOPHIL NFR BLD AUTO: 2 %
ERYTHROCYTE [DISTWIDTH] IN BLOOD BY AUTOMATED COUNT: 14.4 % (ref 10–15)
GLUCOSE SERPL-MCNC: 87 MG/DL (ref 70–99)
HBA1C MFR BLD: 5.1 % (ref 0–5.6)
HCT VFR BLD AUTO: 36.8 % (ref 35–47)
HDLC SERPL-MCNC: 58 MG/DL
HGB BLD-MCNC: 11 G/DL (ref 11.7–15.7)
IMM GRANULOCYTES # BLD: 0 10E3/UL
IMM GRANULOCYTES NFR BLD: 0 %
LDLC SERPL CALC-MCNC: 45 MG/DL
LYMPHOCYTES # BLD AUTO: 2.2 10E3/UL (ref 0.8–5.3)
LYMPHOCYTES NFR BLD AUTO: 30 %
MCH RBC QN AUTO: 25.3 PG (ref 26.5–33)
MCHC RBC AUTO-ENTMCNC: 29.9 G/DL (ref 31.5–36.5)
MCV RBC AUTO: 85 FL (ref 78–100)
MONOCYTES # BLD AUTO: 0.5 10E3/UL (ref 0–1.3)
MONOCYTES NFR BLD AUTO: 7 %
NEUTROPHILS # BLD AUTO: 4.5 10E3/UL (ref 1.6–8.3)
NEUTROPHILS NFR BLD AUTO: 61 %
NONHDLC SERPL-MCNC: 64 MG/DL
PLATELET # BLD AUTO: 337 10E3/UL (ref 150–450)
POTASSIUM SERPL-SCNC: 3.6 MMOL/L (ref 3.4–5.3)
PROT SERPL-MCNC: 7.3 G/DL (ref 6.4–8.3)
RBC # BLD AUTO: 4.34 10E6/UL (ref 3.8–5.2)
SODIUM SERPL-SCNC: 143 MMOL/L (ref 135–145)
TRIGL SERPL-MCNC: 94 MG/DL
TSH SERPL DL<=0.005 MIU/L-ACNC: 1.46 UIU/ML (ref 0.3–4.2)
WBC # BLD AUTO: 7.4 10E3/UL (ref 4–11)

## 2023-10-30 PROCEDURE — 86706 HEP B SURFACE ANTIBODY: CPT | Performed by: ADVANCED PRACTICE MIDWIFE

## 2023-10-30 PROCEDURE — 80061 LIPID PANEL: CPT | Performed by: ADVANCED PRACTICE MIDWIFE

## 2023-10-30 PROCEDURE — 85025 COMPLETE CBC W/AUTO DIFF WBC: CPT | Performed by: ADVANCED PRACTICE MIDWIFE

## 2023-10-30 PROCEDURE — 86695 HERPES SIMPLEX TYPE 1 TEST: CPT | Performed by: ADVANCED PRACTICE MIDWIFE

## 2023-10-30 PROCEDURE — 99385 PREV VISIT NEW AGE 18-39: CPT | Performed by: ADVANCED PRACTICE MIDWIFE

## 2023-10-30 PROCEDURE — 87491 CHLMYD TRACH DNA AMP PROBE: CPT | Performed by: ADVANCED PRACTICE MIDWIFE

## 2023-10-30 PROCEDURE — 87624 HPV HI-RISK TYP POOLED RSLT: CPT | Performed by: ADVANCED PRACTICE MIDWIFE

## 2023-10-30 PROCEDURE — G0145 SCR C/V CYTO,THINLAYER,RESCR: HCPCS | Performed by: ADVANCED PRACTICE MIDWIFE

## 2023-10-30 PROCEDURE — 80053 COMPREHEN METABOLIC PANEL: CPT | Performed by: ADVANCED PRACTICE MIDWIFE

## 2023-10-30 PROCEDURE — 86780 TREPONEMA PALLIDUM: CPT | Performed by: ADVANCED PRACTICE MIDWIFE

## 2023-10-30 PROCEDURE — 83036 HEMOGLOBIN GLYCOSYLATED A1C: CPT | Performed by: ADVANCED PRACTICE MIDWIFE

## 2023-10-30 PROCEDURE — 87389 HIV-1 AG W/HIV-1&-2 AB AG IA: CPT | Performed by: ADVANCED PRACTICE MIDWIFE

## 2023-10-30 PROCEDURE — 86696 HERPES SIMPLEX TYPE 2 TEST: CPT | Performed by: ADVANCED PRACTICE MIDWIFE

## 2023-10-30 PROCEDURE — 86803 HEPATITIS C AB TEST: CPT | Performed by: ADVANCED PRACTICE MIDWIFE

## 2023-10-30 PROCEDURE — 84443 ASSAY THYROID STIM HORMONE: CPT | Performed by: ADVANCED PRACTICE MIDWIFE

## 2023-10-30 PROCEDURE — 36415 COLL VENOUS BLD VENIPUNCTURE: CPT | Performed by: ADVANCED PRACTICE MIDWIFE

## 2023-10-30 PROCEDURE — 87591 N.GONORRHOEAE DNA AMP PROB: CPT | Performed by: ADVANCED PRACTICE MIDWIFE

## 2023-10-30 RX ORDER — PYRIDOXINE HCL (VITAMIN B6) 100 MG
500 TABLET ORAL DAILY
COMMUNITY
End: 2023-10-30

## 2023-10-30 RX ORDER — ESCITALOPRAM OXALATE 10 MG/1
10 TABLET ORAL DAILY
COMMUNITY
Start: 2022-12-07 | End: 2023-10-30

## 2023-10-30 RX ORDER — ESCITALOPRAM OXALATE 10 MG/1
20 TABLET ORAL DAILY
Qty: 90 TABLET | Refills: 3 | Status: SHIPPED | OUTPATIENT
Start: 2023-10-30

## 2023-10-30 ASSESSMENT — ANXIETY QUESTIONNAIRES
7. FEELING AFRAID AS IF SOMETHING AWFUL MIGHT HAPPEN: NEARLY EVERY DAY
1. FEELING NERVOUS, ANXIOUS, OR ON EDGE: SEVERAL DAYS
5. BEING SO RESTLESS THAT IT IS HARD TO SIT STILL: NEARLY EVERY DAY
3. WORRYING TOO MUCH ABOUT DIFFERENT THINGS: NEARLY EVERY DAY
6. BECOMING EASILY ANNOYED OR IRRITABLE: NEARLY EVERY DAY
IF YOU CHECKED OFF ANY PROBLEMS ON THIS QUESTIONNAIRE, HOW DIFFICULT HAVE THESE PROBLEMS MADE IT FOR YOU TO DO YOUR WORK, TAKE CARE OF THINGS AT HOME, OR GET ALONG WITH OTHER PEOPLE: EXTREMELY DIFFICULT
2. NOT BEING ABLE TO STOP OR CONTROL WORRYING: NEARLY EVERY DAY
GAD7 TOTAL SCORE: 19
GAD7 TOTAL SCORE: 19

## 2023-10-30 ASSESSMENT — PATIENT HEALTH QUESTIONNAIRE - PHQ9
5. POOR APPETITE OR OVEREATING: NEARLY EVERY DAY
SUM OF ALL RESPONSES TO PHQ QUESTIONS 1-9: 22

## 2023-10-30 NOTE — NURSING NOTE
"Chief Complaint   Patient presents with    Physical       Initial /88   Ht 1.626 m (5' 4\")   Wt 129.7 kg (286 lb)   Breastfeeding No   BMI 49.09 kg/m   Estimated body mass index is 49.09 kg/m  as calculated from the following:    Height as of this encounter: 1.626 m (5' 4\").    Weight as of this encounter: 129.7 kg (286 lb).  BP completed using cuff size: large    Questioned patient about current smoking habits.  Pt. Uses smokeless -vapes.          The following HM Due: pap smear& wants breast exam due to hx of breast cancer in family.      Brittany Goyal LPN on 10/30/2023 at 11:35 AM                      "

## 2023-10-30 NOTE — PATIENT INSTRUCTIONS

## 2023-10-30 NOTE — PROGRESS NOTES
"Leigh Ann is a 28 year old No obstetric history on file. female who presents for annual exam.     Besides routine health maintenance, she also requests medication refill.  HPI:  Pt here for her annual exam. Reports that mood has improved with medication but feels that she would like to increase the dose as she is not quite where she wants to be.  Menses are absent related to IUD  No LMP recorded..   Using IUD for contraception.    She is not currently considering pregnancy.    REPRODUCTIVE/GYNECOLOGIC HISTORY:  Leigh Ann is sexually active with male partner(s) and is not currently in monogamous relationship.   STI testing offered?  Accepted  History of abnormal Pap smear:  No  SOCIAL HISTORY  Abuse: does not report having previously been physical or sexually abused.    Do you feel safe in your environment? YES       She  reports that she has never smoked. She uses smokeless tobacco.      Last PHQ-9 score on record =       10/30/2023    11:37 AM   PHQ-9 SCORE   PHQ-9 Total Score 22     Last GAD7 score on record =       10/30/2023    11:37 AM   NIKOLAI-7 SCORE   Total Score 19   Reports that she is managing and has no SI or thoughts of harming herself.    HEALTH MAINTENANCE:  Cholesterol: (No results found for: \"CHOL\" History of abnormal lipids: No  Mammo:  N/A History of abnormal Mammo: Not applicable.  Regular Self Breast Exams: Yes  TSH: (No results found for: \"TSH\" )  Pap; (No results found for: \"PAP\" )  Immunizations up to date: yes  (Gardasil, Tdap, Flu)  Health maintenance updated:  yes    HEALTHY HABITS  Eating habits: eats regular meals and follows a balanced nutrition diet  Calcium/Vitamin D intake: source:  dairy Adequate?   Exercise: How often do you exercise? 3-5 times/week;Walking      HISTORY:  OB History    Para Term  AB Living   1 1 1 0 0 1   SAB IAB Ectopic Multiple Live Births   0 0 0 0 1      # Outcome Date GA Lbr Mack/2nd Weight Sex Delivery Anes PTL Lv   1 Term 10/23/22     CS-LTranv   TYSHAWN      " "Complications: Preeclampsia/Hypertension     Past Medical History:   Diagnosis Date    History of pre-eclampsia 10/28/2022     Past Surgical History:   Procedure Laterality Date     SECTION  10/24/2022     No family history on file.  Social History     Socioeconomic History    Marital status: Single       Current Outpatient Medications:     escitalopram (LEXAPRO) 10 MG tablet, Take 2 tablets (20 mg) by mouth daily, Disp: 90 tablet, Rfl: 3    levonorgestrel (MIRENA) 52 MG (20 mcg/day) IUD, 1 each by Intrauterine route once, Disp: , Rfl:    No Known Allergies    Past medical, surgical, social and family history were reviewed and updated in [facility].    ROS:   CONSTITUTIONAL: NEGATIVE for fever, chills, change in weight  INTEGUMENTARU/SKIN: NEGATIVE for worrisome rashes, moles or lesions  EYES: NEGATIVE for vision changes or irritation  ENT: NEGATIVE for ear, mouth and throat problems  RESP: NEGATIVE for significant cough or SOB  BREAST: NEGATIVE for masses, tenderness or discharge  CV: NEGATIVE for chest pain, palpitations or peripheral edema  GI: NEGATIVE for nausea, abdominal pain, heartburn, or change in bowel habits  : NEGATIVE for unusual urinary or vaginal symptoms. Periods are regular.  MUSCULOSKELETAL: NEGATIVE for significant arthralgias or myalgia  NEURO: NEGATIVE for weakness, dizziness or paresthesias  PSYCHIATRIC: NEGATIVE for changes in mood or affect    PHYSICAL EXAM:  /88   Ht 1.626 m (5' 4\")   Wt 129.7 kg (286 lb)   Breastfeeding No   BMI 49.09 kg/m     BMI: Body mass index is 49.09 kg/m .  Constitutional: healthy, alert and no distress  Neck: symmetrical, thyroid normal size, no masses present, no lymphadenopathy present.   Cardiovascular: RRR, no murmurs present  Respiratory: breathing unlabored, lungs CTA bilaterally  Breast:normal without masses, tenderness or nipple discharge and no palpable axillary masses or adenopathy  Gastrointestinal: abdomen soft, non-tender, bowel sounds " present  PELVIC EXAM:  Vulva: No lesions, no adenopathy, BUS WNL  Vagina: Moist, pink, discharge normal  well rugated, no lesions  Cervix:smooth, pink, no visible lesions  Uterus: Normal size, non-tender, mobile  Ovaries: No masses palpated  Rectal exam: deferred    ASSESSMENT/PLAN:    ICD-10-CM    1. Routine general medical examination at a health care facility  Z00.00 Lipid panel reflex to direct LDL Fasting     CBC with platelets and differential     TSH with free T4 reflex     Comprehensive metabolic panel (BMP + Alb, Alk Phos, ALT, AST, Total. Bili, TP)     Pap screen reflex to HPV if ASCUS - recommend age 25 - 29     Lipid panel reflex to direct LDL Fasting     CBC with platelets and differential     TSH with free T4 reflex     Comprehensive metabolic panel (BMP + Alb, Alk Phos, ALT, AST, Total. Bili, TP)      2. Screening for cervical cancer  Z12.4 Pap screen reflex to HPV if ASCUS - recommend age 25 - 29      3. Hx of major depression  Z86.59 escitalopram (LEXAPRO) 10 MG tablet      4. Screen for STD (sexually transmitted disease)  Z11.3 Adult Mental Health [facility] Referral     Hemoglobin A1c     Treponema Abs w Reflex to RPR and Titer     HIV Antigen Antibody Combo     Herpes Simplex Virus 1 and 2 IgG     Hepatitis C antibody     Hepatitis B Surface Antibody     NEISSERIA GONORRHOEA PCR     CHLAMYDIA TRACHOMATIS PCR     Hemoglobin A1c     Treponema Abs w Reflex to RPR and Titer     HIV Antigen Antibody Combo     Herpes Simplex Virus 1 and 2 IgG     Hepatitis C antibody     Hepatitis B Surface Antibody        Results for orders placed or performed in visit on 10/30/23   Hemoglobin A1c     Status: Normal   Result Value Ref Range    Hemoglobin A1C 5.1 0.0 - 5.6 %   CBC with platelets and differential     Status: Abnormal   Result Value Ref Range    WBC Count 7.4 4.0 - 11.0 10e3/uL    RBC Count 4.34 3.80 - 5.20 10e6/uL    Hemoglobin 11.0 (L) 11.7 - 15.7 g/dL    Hematocrit 36.8 35.0 - 47.0 %    MCV 85 78 - 100  fL    MCH 25.3 (L) 26.5 - 33.0 pg    MCHC 29.9 (L) 31.5 - 36.5 g/dL    RDW 14.4 10.0 - 15.0 %    Platelet Count 337 150 - 450 10e3/uL    % Neutrophils 61 %    % Lymphocytes 30 %    % Monocytes 7 %    % Eosinophils 2 %    % Basophils 1 %    % Immature Granulocytes 0 %    Absolute Neutrophils 4.5 1.6 - 8.3 10e3/uL    Absolute Lymphocytes 2.2 0.8 - 5.3 10e3/uL    Absolute Monocytes 0.5 0.0 - 1.3 10e3/uL    Absolute Eosinophils 0.1 0.0 - 0.7 10e3/uL    Absolute Basophils 0.0 0.0 - 0.2 10e3/uL    Absolute Immature Granulocytes 0.0 <=0.4 10e3/uL   CBC with platelets and differential     Status: Abnormal    Narrative    The following orders were created for panel order CBC with platelets and differential.  Procedure                               Abnormality         Status                     ---------                               -----------         ------                     CBC with platelets and d...[[id number]]  Abnormal            Final result                 Please view results for these tests on the individual orders.         COUNSELING:   Reviewed preventive health counseling, as reflected in patient instructions  Special attention given to:        Regular exercise       Healthy diet/nutrition       Safe sex practices/STD prevention       Consider Hep C screening for all patients one time for ages 18-79 years       Syphilis screening for high risk patients        HIV screeninx in teen years, 1x in adult years, and at intervals if high risk   reports that she has never smoked. She uses smokeless tobacco.  Evaluated for desire to quit and pt feels that she is not ready. She knows that she can and has in the past. Will reach out if she wants materials.      WILD Weber CNM

## 2023-10-31 LAB
C TRACH DNA SPEC QL NAA+PROBE: NEGATIVE
HBV SURFACE AB SERPL IA-ACNC: 0.16 M[IU]/ML
HBV SURFACE AB SERPL IA-ACNC: NONREACTIVE M[IU]/ML
HCV AB SERPL QL IA: NONREACTIVE
HIV 1+2 AB+HIV1 P24 AG SERPL QL IA: NONREACTIVE
HSV1 IGG SERPL QL IA: >62.2 INDEX
HSV1 IGG SERPL QL IA: ABNORMAL
HSV2 IGG SERPL QL IA: 1.86 INDEX
HSV2 IGG SERPL QL IA: ABNORMAL
N GONORRHOEA DNA SPEC QL NAA+PROBE: NEGATIVE
T PALLIDUM AB SER QL: NONREACTIVE

## 2023-11-02 LAB
BKR LAB AP GYN ADEQUACY: NORMAL
BKR LAB AP GYN INTERPRETATION: NORMAL
BKR LAB AP HPV REFLEX: NORMAL
BKR LAB AP PREVIOUS ABNORMAL: NORMAL
PATH REPORT.COMMENTS IMP SPEC: NORMAL
PATH REPORT.COMMENTS IMP SPEC: NORMAL
PATH REPORT.RELEVANT HX SPEC: NORMAL

## 2023-11-06 LAB
HUMAN PAPILLOMA VIRUS 16 DNA: NEGATIVE
HUMAN PAPILLOMA VIRUS 18 DNA: NEGATIVE
HUMAN PAPILLOMA VIRUS FINAL DIAGNOSIS: ABNORMAL
HUMAN PAPILLOMA VIRUS OTHER HR: POSITIVE

## 2023-11-09 ENCOUNTER — PATIENT OUTREACH (OUTPATIENT)
Dept: OBGYN | Facility: CLINIC | Age: 28
End: 2023-11-09
Payer: COMMERCIAL

## 2024-01-02 PROBLEM — R87.612 PAPANICOLAOU SMEAR OF CERVIX WITH LOW GRADE SQUAMOUS INTRAEPITHELIAL LESION (LGSIL): Status: ACTIVE | Noted: 2023-11-02

## 2024-05-30 NOTE — TELEPHONE ENCOUNTER
Ivan Fine,   Patient is lost to pap tracking follow-up. Attempts to contact pt have been made per reminder process and there has been no reply and/or no appt scheduled.     Pap Hx:  06/6/17 LSIL Pap  09/12/17 NIL Pap  06/13/18 LSIL Pap  10/2/19 NIL Pap, +HR HPV (not 16 or 18)   02/17/22 NIL Pap  All above in care everywhere   10/30/23 NIL Pap, + HR HPV (neg 16/18). [place] due by 1/30/2024 11/9/2023 Pt notified by phone.   1/2/24 Reminder mychart  1/25/24 [place] not done. Tracking updated for 6 mo colp/pap due 4/30/24.    04/1/24 Reminder Mychart (6mo)  04/29/24 Reminder call-lm (6mo)  05/30/24 Lost to follow-up for pap tracking, karen routed to provider

## 2024-12-15 ENCOUNTER — HEALTH MAINTENANCE LETTER (OUTPATIENT)
Age: 29
End: 2024-12-15